# Patient Record
Sex: MALE | URBAN - METROPOLITAN AREA
[De-identification: names, ages, dates, MRNs, and addresses within clinical notes are randomized per-mention and may not be internally consistent; named-entity substitution may affect disease eponyms.]

---

## 2023-09-12 LAB
CALCIDIOL (25 OH VITAMIN D3) (NG/ML) IN SER/PLAS: 30 NG/ML
COBALAMIN (VITAMIN B12) (PG/ML) IN SER/PLAS: 328 PG/ML (ref 211–911)
ESTIMATED AVERAGE GLUCOSE FOR HBA1C: 114 MG/DL
HEMOGLOBIN A1C/HEMOGLOBIN TOTAL IN BLOOD: 5.6 %
THYROTROPIN (MIU/L) IN SER/PLAS BY DETECTION LIMIT <= 0.05 MIU/L: 1.51 MIU/L (ref 0.44–3.98)

## 2023-10-25 ENCOUNTER — APPOINTMENT (OUTPATIENT)
Dept: INTEGRATIVE MEDICINE | Facility: CLINIC | Age: 68
End: 2023-10-25
Payer: MEDICARE

## 2023-11-21 ENCOUNTER — ALLIED HEALTH (OUTPATIENT)
Dept: INTEGRATIVE MEDICINE | Facility: CLINIC | Age: 68
End: 2023-11-21
Payer: MEDICARE

## 2023-11-21 VITALS
BODY MASS INDEX: 27.12 KG/M2 | SYSTOLIC BLOOD PRESSURE: 119 MMHG | DIASTOLIC BLOOD PRESSURE: 72 MMHG | HEART RATE: 69 BPM | WEIGHT: 189 LBS

## 2023-11-21 DIAGNOSIS — M54.50 CHRONIC BILATERAL LOW BACK PAIN, UNSPECIFIED WHETHER SCIATICA PRESENT: ICD-10-CM

## 2023-11-21 DIAGNOSIS — R97.20 ELEVATED PSA: Primary | ICD-10-CM

## 2023-11-21 DIAGNOSIS — G89.29 CHRONIC BILATERAL LOW BACK PAIN, UNSPECIFIED WHETHER SCIATICA PRESENT: ICD-10-CM

## 2023-11-21 PROBLEM — E53.8 VITAMIN B12 DEFICIENCY: Status: ACTIVE | Noted: 2023-11-21

## 2023-11-21 PROBLEM — N52.9 MALE ERECTILE DISORDER OF ORGANIC ORIGIN: Status: ACTIVE | Noted: 2023-11-21

## 2023-11-21 PROBLEM — M54.9 BACK PAIN: Status: ACTIVE | Noted: 2023-11-21

## 2023-11-21 PROBLEM — R53.82 CHRONIC FATIGUE: Status: ACTIVE | Noted: 2023-11-21

## 2023-11-21 PROCEDURE — 99215 OFFICE O/P EST HI 40 MIN: CPT | Performed by: INTERNAL MEDICINE

## 2023-11-21 ASSESSMENT — ENCOUNTER SYMPTOMS
NERVOUS/ANXIOUS: 0
DIFFICULTY URINATING: 0
WEAKNESS: 0
APPETITE CHANGE: 0
DYSURIA: 0
SHORTNESS OF BREATH: 0
FEVER: 0
BACK PAIN: 0
HEADACHES: 0
MYALGIAS: 0
COUGH: 0
SLEEP DISTURBANCE: 0
FATIGUE: 1
ARTHRALGIAS: 0

## 2023-11-21 NOTE — ASSESSMENT & PLAN NOTE
Discussed how alcohol interupts sleep. Take b12 daily. Encouraged him to try to work on stress with this course work with [employer].

## 2023-11-21 NOTE — ASSESSMENT & PLAN NOTE
Advised to continue work on limiting alcohol use.  Encouraged him to continue to try to eat more whole minimally processed whole plant foods.   Discussed importance of exercise for overall health. He mentioned his erectile dysfunciton and I discussed the work of Dr. Salas and how penile atheroschlerosis may be a cause of erectile dysfunction as can alcohol abuse/overuse.

## 2023-11-21 NOTE — PROGRESS NOTES
Integrative Medicine Follow-up Visit :     Subjective   Patient ID: Dougie Cavanaugh is a 68 y.o. male who presents for prostate issues       HPI  Found a  and works out two days per week with a  near [place] and physical therapy with a work out for 30 minutes.      Is doing the chintucks for his neck pain. Neck pain varies. Helps to exercise regularly. Still struggles with this. No more hand cramping now with the exercises that he is having him do.  He is doing some mobility exercises.  He finds that his best work out times is in the morning. Lately he has not been able to work out first thing in the morning. Harder to get up in the morning in the fall.   Struggles to walk with his 2 dogs.   Feels more tired lately than normal. Worsens in the winter.     Initially was eating ground flax seed into his morning yogurt with fruit. It worked well initially. He lost hte flax seed so he has stopped doing this.     He is taking the b12 and the vitamin d but he sometimes forgets the vitamins.  3-4 days per week he forgets.   Still drinking wine three days per week. Now he has 1-2 glasses at a time. This is still much less than before.     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. He has found this helpful for him to stay on track and not be as emotionally triggered.   He tries to exericse two additional times per week but sturggles with his schedule to do this.        Pain:some neck pain, some pain in his hands.    Review of Systems   Constitutional:  Positive for fatigue. Negative for appetite change and fever.   HENT:  Negative for congestion and hearing loss.    Eyes:  Negative for visual disturbance.   Respiratory:  Negative for cough and shortness of breath.    Cardiovascular:  Negative for chest pain and leg swelling.   Genitourinary:  Negative for difficulty urinating, dysuria and urgency.   Musculoskeletal:  Negative for arthralgias, back pain  and myalgias.   Skin:  Negative for rash.   Neurological:  Negative for weakness and headaches.   Psychiatric/Behavioral:  Negative for behavioral problems and sleep disturbance. The patient is not nervous/anxious.                   Objective   /72   Pulse 69   Wt 85.7 kg (189 lb)   BMI 27.12 kg/m²     Physical Exam  HENT:      Head: Normocephalic and atraumatic.      Mouth/Throat:      Mouth: Mucous membranes are moist.   Cardiovascular:      Rate and Rhythm: Normal rate.   Pulmonary:      Effort: Pulmonary effort is normal. No respiratory distress.   Musculoskeletal:         General: No swelling or deformity.      Cervical back: Normal range of motion.   Skin:     General: Skin is dry.      Findings: No rash.   Neurological:      General: No focal deficit present.      Mental Status: He is alert and oriented to person, place, and time.   Psychiatric:      Comments: Normal affect                      Assessment/Plan     Problem List Items Addressed This Visit             ICD-10-CM    Back pain M54.9     Suggest acupuncture.          Elevated PSA - Primary R97.20     Advised to continue work on limiting alcohol use.  Encouraged him to continue to try to eat more whole minimally processed whole plant foods.   Discussed importance of exercise for overall health. He mentioned his erectile dysfunciton and I discussed the work of Dr. Salas and how penile atheroschlerosis may be a cause of erectile dysfunction as can alcohol abuse/overuse.               Recommend Follow up in : 2 months    Argelia Miranda MD PhD    Time Spent  Prep time on day of patient encounter: 5 minutes  Time spent directly with patient, family or caregiver: 40 minutes  Additional Time Spent on Patient Care Activities: 0 minutes  Documentation Time: 8 minutes  Other Time Spent: 0 minutes  Total: 53 minutes

## 2023-11-21 NOTE — PATIENT INSTRUCTIONS
10,000 Lux light to use for 20-30 minutes in the morning.   Try to take a 20-30 minute daily ideally.   Restart the ground flax seed. Store the flax seed in the fridge.  Please buy a quick dissolve Nature's bounty vitamin b12 and put one bottle in your car to help your remember to take it daily.  If you forget your vitamin d capsule, take two the next day.   Your Conscience by Vinicius Flores    Follow up 2 months.   Argelia Miranda MD PhD

## 2024-01-24 ENCOUNTER — ALLIED HEALTH (OUTPATIENT)
Dept: INTEGRATIVE MEDICINE | Facility: CLINIC | Age: 69
End: 2024-01-24
Payer: MEDICARE

## 2024-01-24 VITALS
WEIGHT: 196 LBS | DIASTOLIC BLOOD PRESSURE: 83 MMHG | OXYGEN SATURATION: 98 % | BODY MASS INDEX: 28.12 KG/M2 | SYSTOLIC BLOOD PRESSURE: 123 MMHG | HEART RATE: 85 BPM

## 2024-01-24 DIAGNOSIS — R53.82 CHRONIC FATIGUE: Primary | ICD-10-CM

## 2024-01-24 DIAGNOSIS — E53.8 VITAMIN B12 DEFICIENCY: ICD-10-CM

## 2024-01-24 DIAGNOSIS — N52.9 MALE ERECTILE DISORDER OF ORGANIC ORIGIN: ICD-10-CM

## 2024-01-24 PROCEDURE — 99215 OFFICE O/P EST HI 40 MIN: CPT | Performed by: INTERNAL MEDICINE

## 2024-01-24 RX ORDER — DESLORATADINE AND PSEUDOEPHEDRINE SULFATE 2.5; 12 MG/1; MG/1
1 TABLET, EXTENDED RELEASE ORAL 2 TIMES DAILY
COMMUNITY

## 2024-01-24 RX ORDER — FLUTICASONE PROPIONATE 50 MCG
1 SPRAY, SUSPENSION (ML) NASAL DAILY
COMMUNITY

## 2024-01-24 RX ORDER — ERGOCALCIFEROL 1.25 MG/1
1.25 CAPSULE ORAL
COMMUNITY

## 2024-01-24 RX ORDER — LANOLIN ALCOHOL/MO/W.PET/CERES
1000 CREAM (GRAM) TOPICAL DAILY
COMMUNITY

## 2024-01-24 ASSESSMENT — ENCOUNTER SYMPTOMS
NECK PAIN: 1
MYALGIAS: 0
SLEEP DISTURBANCE: 0
APPETITE CHANGE: 0
COUGH: 0
HEADACHES: 0
FEVER: 0
SHORTNESS OF BREATH: 0
DYSURIA: 0
DIFFICULTY URINATING: 0
FATIGUE: 1
BACK PAIN: 0
ARTHRALGIAS: 0
WEAKNESS: 0
NERVOUS/ANXIOUS: 0

## 2024-01-24 NOTE — ASSESSMENT & PLAN NOTE
Discussed with patient that he should discuss with his urologist. Discussed the work of Dr. Houston Salas and how whole food plant based diet can help reverse some cases of erectile dysfunction. (Penile blood flow). Recommended websties to learn more about how to eat this way.

## 2024-01-24 NOTE — ASSESSMENT & PLAN NOTE
Doing a better job with vitamin d and vitmain b12. Encouraged more vigorous cardio exercise. Discussed light therapy in the winter. Do not think he is depressed but may have a component of seasonal affective disorder. Short term follow up with me. Continue other healthy activities like his exercise with pilates and strength .

## 2024-01-24 NOTE — PATIENT INSTRUCTIONS
" Continue to limit alcohol to two days per week.   Test vitamin b12 and vitamin d in six more weeks.  Look into treadmill workouts. Try to walk either outside or on a treatmill for twenty minutes 3x per week.   Try the 10,000 LUX light when you get dressed in the morning or at your desk in the morning for 10-15 minutes.   Get another vitamin d for your car to help you remember.   Check out the videos by Olimpia Salas on you tube How to be a plant based woman warrior by Josue.   Check out programming from [facility].  \"Lift Program\"  Follow up one month.  Argelia Miranda MD PhD   "

## 2024-01-24 NOTE — PROGRESS NOTES
Integrative Medicine Follow-up Visit :     Subjective   Patient ID: Dougie Cavanaugh is a 69 y.o. male who presents for fuv        HPI  Found a new .  This is working well. Enjoys getting exercise but recognizes he is not doing enough cardio because stopped wlaking in winter due to weather. Has a treadmill.   Doing the chin tuck. Not many neck problems right now. Not having the hand problems now.   Continues the physical therapy. Also does strength for thirty minutes  Continues to limit alcohol- not perfect during the holidays.    Has a couple glasses of wine Fridays and satursdays. Does not feel as attached to alcohol and sleep seems better and hard to tell if it is helpinghis pain because PT is helping.     Taking vitaminb 12 and taking vitamin d. Taking both daily as best that he can.   Struggles with the ground flax seed. Uses in oatmeal and yogurt.     Exercise for cardio: walking well in the early fall 3-5 times per week. Has a treadmill. Sadness and low energy in the winter. Has a bit of winter blues in January. Did not try phototherapy.     Having issues with erections. Has not discussed with his pcp. Interested in my viewpoint. Has not had intercourse with his wife in quite some time. Upcoming trip to [place].          Pain:neck and shoulder pain. Helped with PT.     Review of Systems   Constitutional:  Positive for fatigue. Negative for appetite change and fever.   HENT:  Negative for congestion and hearing loss.    Eyes:  Negative for visual disturbance.   Respiratory:  Negative for cough and shortness of breath.    Cardiovascular:  Negative for chest pain and leg swelling.   Genitourinary:  Negative for difficulty urinating, dysuria and urgency.   Musculoskeletal:  Positive for neck pain. Negative for arthralgias, back pain and myalgias.   Skin:  Negative for rash.   Neurological:  Negative for weakness and headaches.   Psychiatric/Behavioral:  Negative for behavioral problems and sleep  disturbance. The patient is not nervous/anxious.                   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 Discussed the work of Dr. Houston Salas and how whole food plant based diet can help reverse some cases of erectile dysfunction. (Penile blood flow). Recommended websties to learn more about how to eat this way.          Vitamin B12 deficiency E53.8     Keep taking b12 for additional two more months then check level.          Chronic fatigue - Primary R53.82     Doing a better job with vitamin d and vitmain b12. Encouraged more vigorous cardio exercise. Discussed light therapy in the winter. Do not think he is depressed but may have a component of seasonal affective disorder. Short term follow up with me. Continue other healthy activities like his exercise with pilates and strength .               Recommend Follow up in : 1-2 months    Argelia Miranda MD PhD    Time Spent  Prep time on day of patient encounter: 5 minutes  Time spent directly with patient, family or caregiver: 30 minutes  Additional Time Spent on Patient Care Activities: 0 minutes  Documentation Time: 5 minutes  Other Time Spent: 0 minutes  Total: 40 minutes

## 2024-03-06 ENCOUNTER — ALLIED HEALTH (OUTPATIENT)
Dept: INTEGRATIVE MEDICINE | Facility: CLINIC | Age: 69
End: 2024-03-06
Payer: MEDICARE

## 2024-03-06 VITALS
OXYGEN SATURATION: 98 % | DIASTOLIC BLOOD PRESSURE: 78 MMHG | HEART RATE: 87 BPM | WEIGHT: 194.2 LBS | BODY MASS INDEX: 27.86 KG/M2 | SYSTOLIC BLOOD PRESSURE: 112 MMHG

## 2024-03-06 DIAGNOSIS — R97.20 ELEVATED PSA: Primary | ICD-10-CM

## 2024-03-06 DIAGNOSIS — E55.9 VITAMIN D DEFICIENCY: ICD-10-CM

## 2024-03-06 DIAGNOSIS — R53.82 CHRONIC FATIGUE: ICD-10-CM

## 2024-03-06 DIAGNOSIS — E53.8 VITAMIN B12 DEFICIENCY: ICD-10-CM

## 2024-03-06 PROCEDURE — 99215 OFFICE O/P EST HI 40 MIN: CPT | Performed by: INTERNAL MEDICINE

## 2024-03-06 ASSESSMENT — ENCOUNTER SYMPTOMS
FATIGUE: 1
HEADACHES: 0
SLEEP DISTURBANCE: 0
DYSURIA: 0
NERVOUS/ANXIOUS: 0
ARTHRALGIAS: 0
COUGH: 0
OCCASIONAL FEELINGS OF UNSTEADINESS: 0
FEVER: 0
APPETITE CHANGE: 0
NECK PAIN: 1
DIFFICULTY URINATING: 0
MYALGIAS: 0
WEAKNESS: 0
DEPRESSION: 0
SHORTNESS OF BREATH: 0
LOSS OF SENSATION IN FEET: 0
BACK PAIN: 0

## 2024-03-06 NOTE — PATIENT INSTRUCTIONS
See urology to discuss issue  Switch to soy milk yogurt or almond milk yogurt.   [facility] in [place] in April 2024. Bruce Salas runs the retreat  Get your lip looked at by Dr. Ramos  Keep taking your b12 and ama Salas Seven Day Rescue Diet.  Suggest consider reading this book.     Follow up 2 months.   Argelia Miranda MD PhD

## 2024-03-06 NOTE — ASSESSMENT & PLAN NOTE
Advised to further reduce alcohol.   Discussed relationship between dairy and prostate cancer. Perhaps further limit cheese/ yogurt.

## 2024-03-06 NOTE — PROGRESS NOTES
Integrative Medicine Follow-up Visit :     Subjective   Patient ID: Dougie Cavanaugh is a 69 y.o. male who presents for fuv       HPI  Got much better about taking the vitamin d and taking the vitamin b12. So he is seeing some improvement with his energy but still not enough for him to avoid having a nap in the late afternoon when wife making dinner.   Did not see urology for his erectile dysfunction.    Has stopped having meat at breakfast and lunch. Sometimes getting beans at lunch and dinner. Gets black beans or black eyed peas or edamame. Puts chickpeas on his salads. Typical breakfast is greek yogurt with flax seed and berries.     Continues to limit alcohol to 2-3 days per week. He thinks this has worked well. He feels better. Goal is to limit to 2 days per week but not perfect.     Continues to exercise.        Pain:not discussed today    Review of Systems   Constitutional:  Positive for fatigue. Negative for appetite change and fever.   HENT:  Negative for congestion and hearing loss.    Eyes:  Negative for visual disturbance.   Respiratory:  Negative for cough and shortness of breath.    Cardiovascular:  Negative for chest pain and leg swelling.   Genitourinary:  Negative for difficulty urinating, dysuria and urgency.   Musculoskeletal:  Positive for neck pain. Negative for arthralgias, back pain and myalgias.   Skin:  Negative for rash.   Neurological:  Negative for weakness and headaches.   Psychiatric/Behavioral:  Negative for behavioral problems and sleep disturbance. The patient is not nervous/anxious.                   Objective   /78 (BP Location: Left arm, Patient Position: Sitting, BP Cuff Size: Large adult)   Pulse 87   Wt 88.1 kg (194 lb 3.2 oz)   SpO2 98%   BMI 27.86 kg/m²     Physical Exam  HENT:      Head: Normocephalic and atraumatic.      Mouth/Throat:      Mouth: Mucous membranes are moist.   Cardiovascular:      Rate and Rhythm: Normal rate.   Pulmonary:      Effort: Pulmonary  effort is normal. No respiratory distress.   Musculoskeletal:         General: No swelling or deformity.      Cervical back: Normal range of motion.   Skin:     General: Skin is dry.      Findings: No rash.   Neurological:      General: No focal deficit present.      Mental Status: He is alert and oriented to person, place, and time.   Psychiatric:      Comments: Normal affect                      Assessment/Plan     Problem List Items Addressed This Visit             ICD-10-CM    Elevated PSA - Primary R97.20     Advised to further reduce alcohol.   Discussed relationship between dairy and prostate cancer. Perhaps further limit cheese/ yogurt.          Vitamin B12 deficiency E53.8    Relevant Orders    Vitamin B12    Chronic fatigue R53.82     Improved. Continue vitamin b12         Vitamin D deficiency E55.9    Relevant Orders    Vitamin D 25-Hydroxy,Total (for eval of Vitamin D levels)         Recommend Follow up in : six weeks    Argelia Miranda MD PhD

## 2024-04-30 ENCOUNTER — APPOINTMENT (OUTPATIENT)
Dept: INTEGRATIVE MEDICINE | Facility: CLINIC | Age: 69
End: 2024-04-30
Payer: MEDICARE

## 2024-05-06 ENCOUNTER — APPOINTMENT (OUTPATIENT)
Dept: INTEGRATIVE MEDICINE | Facility: CLINIC | Age: 69
End: 2024-05-06
Payer: MEDICARE

## 2024-06-19 ENCOUNTER — APPOINTMENT (OUTPATIENT)
Dept: INTEGRATIVE MEDICINE | Facility: CLINIC | Age: 69
End: 2024-06-19
Payer: MEDICARE

## 2024-06-19 VITALS
BODY MASS INDEX: 28.06 KG/M2 | DIASTOLIC BLOOD PRESSURE: 80 MMHG | HEART RATE: 87 BPM | WEIGHT: 196 LBS | SYSTOLIC BLOOD PRESSURE: 123 MMHG | HEIGHT: 70 IN | OXYGEN SATURATION: 99 %

## 2024-06-19 DIAGNOSIS — E53.8 VITAMIN B12 DEFICIENCY: ICD-10-CM

## 2024-06-19 DIAGNOSIS — R97.20 ELEVATED PSA: ICD-10-CM

## 2024-06-19 DIAGNOSIS — R53.82 CHRONIC FATIGUE: Primary | ICD-10-CM

## 2024-06-19 PROCEDURE — 99215 OFFICE O/P EST HI 40 MIN: CPT | Performed by: INTERNAL MEDICINE

## 2024-06-19 RX ORDER — BUPROPION HYDROCHLORIDE 150 MG/1
TABLET, EXTENDED RELEASE ORAL
COMMUNITY
Start: 2024-04-27

## 2024-06-19 NOTE — PROGRESS NOTES
"Integrative Medicine Follow-up Visit :     Subjective   Patient ID: Dougie Cavanaugh is a 69 y.o. male who presents for fuv       HPI  He is doing three full workouts of exercise with a  and gets a day in at home. Walks the dogs 2 days per week.   Is taking classes on spirituality at [facility] and this is helping his stress management. Doing this at least five days per week and often dialy.     Very dry sinuses which are uncomfortable. Drinks a lot of water  Stopped all soda pop. Drinks water and fruit juice water. Adds saqib to water.   Some cheese and dairy.   Some alcohol on the weekends.   Drinks oat milk and oat yogurt. Does use regular milk on cereal.     Eats a big salad and sometimes two per day. Eats watermelon with fresh berries. And coffee cake.   3 slices of elias broil, salad and green beans. No alcohol. Bread.     Has not been using ground flax seed. Ran out. Kind of forgot about how it might be helpful for his prostate.        Pain:denies any pain; neck pain is improved with all his exercise.     Review of Systems   Constitutional:  Positive for fatigue. Negative for appetite change and fever.   HENT:  Negative for congestion and hearing loss.    Eyes:  Negative for visual disturbance.   Respiratory:  Negative for cough and shortness of breath.    Cardiovascular:  Negative for chest pain and leg swelling.   Genitourinary:  Negative for difficulty urinating, dysuria and urgency.   Musculoskeletal:  Positive for neck pain. Negative for arthralgias, back pain and myalgias.   Skin:  Negative for rash.   Neurological:  Negative for weakness and headaches.   Psychiatric/Behavioral:  Negative for behavioral problems and sleep disturbance. The patient is not nervous/anxious.                   Objective   /80 (BP Location: Left arm, Patient Position: Sitting, BP Cuff Size: Adult)   Pulse 87   Ht 1.778 m (5' 10\")   Wt 88.9 kg (196 lb)   SpO2 99%   BMI 28.12 kg/m²     Physical Exam  HENT:      Head: " Normocephalic and atraumatic.      Mouth/Throat:      Mouth: Mucous membranes are moist.   Cardiovascular:      Rate and Rhythm: Normal rate.   Pulmonary:      Effort: Pulmonary effort is normal. No respiratory distress.   Musculoskeletal:         General: No swelling or deformity.      Cervical back: Normal range of motion.   Skin:     General: Skin is dry.      Findings: No rash.   Neurological:      General: No focal deficit present.      Mental Status: He is alert and oriented to person, place, and time.   Psychiatric:      Comments: Normal affect                      Assessment/Plan     Problem List Items Addressed This Visit             ICD-10-CM    Elevated PSA R97.20     Diet discussed. Encouraged to continue to limit alcohol. Ground flax seeds.          Vitamin B12 deficiency E53.8     Continue supplement. Check labs yearly.          Chronic fatigue - Primary R53.82     Improved. Continue to work on sleep              Recommend Follow up in :  3 months    Argelia Miranda MD PhD    Time Spent  Prep time on day of patient encounter: 5 minutes  Time spent directly with patient, family or caregiver: 30 minutes  Additional Time Spent on Patient Care Activities: 0 minutes  Documentation Time: 5 minutes  Other Time Spent: 0 minutes  Total: 40 minutes

## 2024-06-19 NOTE — PATIENT INSTRUCTIONS
Check hepa filter in whole house  Consider a 100$ hepa filter.   Start using nasal saline spray 3-4 times per day. See if this helps your congestion.  Get back to eating more ground flax  Try to walk the dog daily. It is a great stress reliever. Use your mindfulness practices when you walk.   Continue to limit alcohol.   Get pilloiwcase covers that are hepa.   Follow up  3 months.  Argelia Miranda MD PhD

## 2024-06-20 PROBLEM — R09.81 NASAL CONGESTION: Status: ACTIVE | Noted: 2024-06-20

## 2024-06-20 ASSESSMENT — ENCOUNTER SYMPTOMS
NERVOUS/ANXIOUS: 0
WEAKNESS: 0
SLEEP DISTURBANCE: 0
MYALGIAS: 0
FEVER: 0
HEADACHES: 0
DYSURIA: 0
ARTHRALGIAS: 0
APPETITE CHANGE: 0
BACK PAIN: 0
COUGH: 0
DIFFICULTY URINATING: 0
SHORTNESS OF BREATH: 0
NECK PAIN: 1
FATIGUE: 1

## 2024-06-20 NOTE — ASSESSMENT & PLAN NOTE
Continue to decrease exposure to dust mites  Nasal saline spray  Decrease milk.   Drink plenty of water

## 2024-10-08 ENCOUNTER — APPOINTMENT (OUTPATIENT)
Dept: INTEGRATIVE MEDICINE | Facility: CLINIC | Age: 69
End: 2024-10-08
Payer: MEDICARE

## 2024-10-08 VITALS
SYSTOLIC BLOOD PRESSURE: 139 MMHG | DIASTOLIC BLOOD PRESSURE: 81 MMHG | WEIGHT: 198 LBS | HEART RATE: 87 BPM | OXYGEN SATURATION: 97 % | BODY MASS INDEX: 28.41 KG/M2

## 2024-10-08 DIAGNOSIS — E55.9 VITAMIN D DEFICIENCY: ICD-10-CM

## 2024-10-08 DIAGNOSIS — R53.82 CHRONIC FATIGUE: ICD-10-CM

## 2024-10-08 DIAGNOSIS — R97.20 ELEVATED PSA: Primary | ICD-10-CM

## 2024-10-08 DIAGNOSIS — R09.81 NASAL CONGESTION: ICD-10-CM

## 2024-10-08 DIAGNOSIS — E53.8 VITAMIN B12 DEFICIENCY: ICD-10-CM

## 2024-10-08 PROCEDURE — 99214 OFFICE O/P EST MOD 30 MIN: CPT | Performed by: INTERNAL MEDICINE

## 2024-10-08 ASSESSMENT — ENCOUNTER SYMPTOMS
COUGH: 0
BACK PAIN: 0
ARTHRALGIAS: 0
NERVOUS/ANXIOUS: 0
SLEEP DISTURBANCE: 0
APPETITE CHANGE: 0
FATIGUE: 1
DYSURIA: 0
MYALGIAS: 0
DIFFICULTY URINATING: 0
FEVER: 0
NECK PAIN: 1
HEADACHES: 0
WEAKNESS: 0
SHORTNESS OF BREATH: 0

## 2024-10-08 NOTE — PATIENT INSTRUCTIONS
Try non dairy ice cream.   Recommend getting the vitamin b12 level done. Skip your supplement for at least two days before the test.   Buy more b12 for your car.   Raul Virgen at [employer] for dog training.   Recommend walking five days per week for 30 minutes. Use this as stress management.    Get your vitamin b12 and vitamin D level done.   1 tablespoon of wheat germ daily  Take vitamin b12 daily  Take vitamin D daily  Take the nordic natural ALGAE DHA/EPA supplements  Consider switching to soy milk instead of oat milk. (Spermidine)  Follow up in 3 months.   Argelia Miranda MD PhD

## 2024-10-08 NOTE — PROGRESS NOTES
Integrative Medicine Follow-up Visit :     Subjective   Patient ID: Dougie Cavanaugh is a 69 y.o. male who presents for Follow-up       HPI  Just getting over a cold. Getting post nasal drip and making him cough. He increased flonase. Doing nasal irrigation.   He thinks he is drinking enough water. He limits water at night because of nocturia. Still eating cheese and ice cream.   Doing better with taking vitamin b12.  He finished a bottle in his car so needs to buy more. Taking five days per week.      Does pilates two days per week and PT plus strength training one day and one day of strength training.   Walking his dogs very little. Maybe goes once per week.   Still consuming less alcohol. Drinks wine on the weekends pretty much. Drinks 3 days per week.     Waking up a lot to urinate. Went to see urologist.         Review of Systems   Constitutional:  Positive for fatigue. Negative for appetite change and fever.   HENT:  Negative for congestion and hearing loss.    Eyes:  Negative for visual disturbance.   Respiratory:  Negative for cough and shortness of breath.    Cardiovascular:  Negative for chest pain and leg swelling.   Genitourinary:  Negative for difficulty urinating, dysuria and urgency.   Musculoskeletal:  Positive for neck pain. Negative for arthralgias, back pain and myalgias.   Skin:  Negative for rash.   Neurological:  Negative for weakness and headaches.   Psychiatric/Behavioral:  Negative for behavioral problems and sleep disturbance. The patient is not nervous/anxious.                   Objective   /81 (BP Location: Left arm, Patient Position: Sitting, BP Cuff Size: Adult)   Pulse 87   Wt 89.8 kg (198 lb)   SpO2 97%   BMI 28.41 kg/m²     Physical Exam  HENT:      Head: Normocephalic and atraumatic.      Mouth/Throat:      Mouth: Mucous membranes are moist.   Cardiovascular:      Rate and Rhythm: Normal rate.   Pulmonary:      Effort: Pulmonary effort is normal. No respiratory distress.    Musculoskeletal:         General: No swelling or deformity.      Cervical back: Normal range of motion.   Skin:     General: Skin is dry.      Findings: No rash.   Neurological:      General: No focal deficit present.      Mental Status: He is alert and oriented to person, place, and time.   Psychiatric:      Comments: Normal affect                      Assessment/Plan     Problem List Items Addressed This Visit             ICD-10-CM    Elevated PSA - Primary R97.20    Vitamin B12 deficiency E53.8    Chronic fatigue R53.82     Seems abot the same. Check b12.          Vitamin D deficiency E55.9    Nasal congestion R09.81     Avoid dairy discussed again.  Gave non dairy options. Discussed milik consumption and increased risk of prostate cance.r             Not getting enough cardio but is doing great with strength and flexibility  Drinking a bit more alcohol which could be playing into this sleep issues and intimacy issues.   Recheck b12 would like the level above 700 pg/ml  Suggested supplements discussed  Recommend Follow up in : 3 monts.   See pt instructions.     Argelia Miranda MD PhD    Time Spent  Prep time on day of patient encounter: 2 minutes  Time spent directly with patient, family or caregiver: 30 minutes  Additional Time Spent on Patient Care Activities: 0 minutes  Documentation Time: 5 minutes  Other Time Spent: 0 minutes  Total: 37 minutes

## 2024-10-08 NOTE — ASSESSMENT & PLAN NOTE
Avoid dairy discussed again.  Gave non dairy options. Discussed milik consumption and increased risk of prostate cance.r

## 2025-02-11 ENCOUNTER — APPOINTMENT (OUTPATIENT)
Dept: INTEGRATIVE MEDICINE | Facility: CLINIC | Age: 70
End: 2025-02-11
Payer: MEDICARE